# Patient Record
Sex: MALE | Race: BLACK OR AFRICAN AMERICAN | Employment: OTHER | URBAN - METROPOLITAN AREA
[De-identification: names, ages, dates, MRNs, and addresses within clinical notes are randomized per-mention and may not be internally consistent; named-entity substitution may affect disease eponyms.]

---

## 2020-09-11 ENCOUNTER — HOSPITAL ENCOUNTER (OUTPATIENT)
Dept: SURGERY | Age: 45
Discharge: HOME | End: 2020-09-11

## 2020-09-13 ENCOUNTER — ANESTHESIA EVENT (OUTPATIENT)
Dept: SURGERY | Age: 45
End: 2020-09-13

## 2020-09-14 ENCOUNTER — HOSPITAL ENCOUNTER (OUTPATIENT)
Age: 45
Setting detail: OUTPATIENT SURGERY
Discharge: HOME | End: 2020-09-14
Attending: ORTHOPAEDIC SURGERY | Admitting: ORTHOPAEDIC SURGERY

## 2020-09-14 ENCOUNTER — APPOINTMENT (OUTPATIENT)
Dept: GENERAL RADIOLOGY | Age: 45
End: 2020-09-14
Attending: ORTHOPAEDIC SURGERY

## 2020-09-14 ENCOUNTER — ANESTHESIA (OUTPATIENT)
Dept: SURGERY | Age: 45
End: 2020-09-14

## 2020-09-14 VITALS
HEART RATE: 60 BPM | DIASTOLIC BLOOD PRESSURE: 67 MMHG | SYSTOLIC BLOOD PRESSURE: 135 MMHG | BODY MASS INDEX: 25.42 KG/M2 | RESPIRATION RATE: 16 BRPM | TEMPERATURE: 98 F | OXYGEN SATURATION: 98 % | WEIGHT: 198 LBS

## 2020-09-14 PROCEDURE — 74011250636 HC RX REV CODE- 250/636: Performed by: ORTHOPAEDIC SURGERY

## 2020-09-14 PROCEDURE — 74011250636 HC RX REV CODE- 250/636: Performed by: NURSE ANESTHETIST, CERTIFIED REGISTERED

## 2020-09-14 PROCEDURE — 76210000006 HC OR PH I REC 0.5 TO 1 HR: Performed by: ORTHOPAEDIC SURGERY

## 2020-09-14 PROCEDURE — C1713 ANCHOR/SCREW BN/BN,TIS/BN: HCPCS | Performed by: ORTHOPAEDIC SURGERY

## 2020-09-14 PROCEDURE — 77030003044 HC WRE K WRGH -B: Performed by: ORTHOPAEDIC SURGERY

## 2020-09-14 PROCEDURE — 74011000250 HC RX REV CODE- 250: Performed by: NURSE ANESTHETIST, CERTIFIED REGISTERED

## 2020-09-14 PROCEDURE — 77030012771 HC BIT DRL WRGH -B: Performed by: ORTHOPAEDIC SURGERY

## 2020-09-14 PROCEDURE — 77030000032 HC CUF TRNQT ZIMM -B: Performed by: ORTHOPAEDIC SURGERY

## 2020-09-14 PROCEDURE — 77030010509 HC AIRWY LMA MSK TELE -A: Performed by: ANESTHESIOLOGY

## 2020-09-14 PROCEDURE — 74011250637 HC RX REV CODE- 250/637: Performed by: ANESTHESIOLOGY

## 2020-09-14 PROCEDURE — 74011250636 HC RX REV CODE- 250/636: Performed by: ANESTHESIOLOGY

## 2020-09-14 PROCEDURE — 77030040922 HC BLNKT HYPOTHRM STRY -A: Performed by: ANESTHESIOLOGY

## 2020-09-14 PROCEDURE — 77030019908 HC STETH ESOPH SIMS -A: Performed by: ANESTHESIOLOGY

## 2020-09-14 PROCEDURE — 76210000020 HC REC RM PH II FIRST 0.5 HR: Performed by: ORTHOPAEDIC SURGERY

## 2020-09-14 PROCEDURE — 76942 ECHO GUIDE FOR BIOPSY: CPT | Performed by: ORTHOPAEDIC SURGERY

## 2020-09-14 PROCEDURE — 2709999900 HC NON-CHARGEABLE SUPPLY: Performed by: ORTHOPAEDIC SURGERY

## 2020-09-14 PROCEDURE — 76060000036 HC ANESTHESIA 2.5 TO 3 HR: Performed by: ORTHOPAEDIC SURGERY

## 2020-09-14 PROCEDURE — 76010000172 HC OR TIME 2.5 TO 3 HR INTENSV-TIER 1: Performed by: ORTHOPAEDIC SURGERY

## 2020-09-14 PROCEDURE — 77030002933 HC SUT MCRYL J&J -A: Performed by: ORTHOPAEDIC SURGERY

## 2020-09-14 PROCEDURE — 77030029732 HC BIT DRL ORTHOLOC 3DI WRGH -B: Performed by: ORTHOPAEDIC SURGERY

## 2020-09-14 PROCEDURE — 77030021122 HC SPLNT MAT FST BSNM -A: Performed by: ORTHOPAEDIC SURGERY

## 2020-09-14 PROCEDURE — 77030003602 HC NDL NRV BLK BBMI -B: Performed by: NURSE ANESTHETIST, CERTIFIED REGISTERED

## 2020-09-14 PROCEDURE — 76010010054 HC POST OP PAIN BLOCK: Performed by: ORTHOPAEDIC SURGERY

## 2020-09-14 DEVICE — CORTICAL SCREW
Type: IMPLANTABLE DEVICE | Site: ANKLE | Status: FUNCTIONAL
Brand: ORTHOLOC

## 2020-09-14 DEVICE — IMPLANTABLE DEVICE
Type: IMPLANTABLE DEVICE | Site: ANKLE | Status: FUNCTIONAL
Brand: ORTHOLOC 3DI

## 2020-09-14 DEVICE — HEADED COMPRESSION SCREW
Type: IMPLANTABLE DEVICE | Site: ANKLE | Status: FUNCTIONAL
Brand: DART-FIRE

## 2020-09-14 DEVICE — IMPLANTABLE DEVICE
Type: IMPLANTABLE DEVICE | Site: ANKLE | Status: FUNCTIONAL
Brand: ORTHOLOC

## 2020-09-14 DEVICE — K-WIRE BLUNT/TROCAR: Type: IMPLANTABLE DEVICE | Site: ANKLE | Status: FUNCTIONAL

## 2020-09-14 DEVICE — IMPLANTABLE DEVICE
Type: IMPLANTABLE DEVICE | Site: ANKLE | Status: FUNCTIONAL
Brand: GRAVITY SYNCHFIX

## 2020-09-14 DEVICE — IMPLANTABLE DEVICE
Type: IMPLANTABLE DEVICE | Site: ANKLE | Status: FUNCTIONAL
Brand: ORTHOLOC 3DI PLATING SYSTEM

## 2020-09-14 RX ORDER — DEXAMETHASONE SODIUM PHOSPHATE 4 MG/ML
INJECTION, SOLUTION INTRA-ARTICULAR; INTRALESIONAL; INTRAMUSCULAR; INTRAVENOUS; SOFT TISSUE AS NEEDED
Status: DISCONTINUED | OUTPATIENT
Start: 2020-09-14 | End: 2020-09-14 | Stop reason: HOSPADM

## 2020-09-14 RX ORDER — SODIUM CHLORIDE 0.9 % (FLUSH) 0.9 %
5-40 SYRINGE (ML) INJECTION AS NEEDED
Status: DISCONTINUED | OUTPATIENT
Start: 2020-09-14 | End: 2020-09-14 | Stop reason: HOSPADM

## 2020-09-14 RX ORDER — MIDAZOLAM HYDROCHLORIDE 1 MG/ML
2 INJECTION, SOLUTION INTRAMUSCULAR; INTRAVENOUS
Status: COMPLETED | OUTPATIENT
Start: 2020-09-14 | End: 2020-09-14

## 2020-09-14 RX ORDER — LIDOCAINE HYDROCHLORIDE 20 MG/ML
INJECTION, SOLUTION EPIDURAL; INFILTRATION; INTRACAUDAL; PERINEURAL AS NEEDED
Status: DISCONTINUED | OUTPATIENT
Start: 2020-09-14 | End: 2020-09-14 | Stop reason: HOSPADM

## 2020-09-14 RX ORDER — HYDROMORPHONE HYDROCHLORIDE 2 MG/ML
0.5 INJECTION, SOLUTION INTRAMUSCULAR; INTRAVENOUS; SUBCUTANEOUS
Status: DISCONTINUED | OUTPATIENT
Start: 2020-09-14 | End: 2020-09-14 | Stop reason: HOSPADM

## 2020-09-14 RX ORDER — PROPOFOL 10 MG/ML
INJECTION, EMULSION INTRAVENOUS AS NEEDED
Status: DISCONTINUED | OUTPATIENT
Start: 2020-09-14 | End: 2020-09-14 | Stop reason: HOSPADM

## 2020-09-14 RX ORDER — SODIUM CHLORIDE, SODIUM LACTATE, POTASSIUM CHLORIDE, CALCIUM CHLORIDE 600; 310; 30; 20 MG/100ML; MG/100ML; MG/100ML; MG/100ML
75 INJECTION, SOLUTION INTRAVENOUS CONTINUOUS
Status: DISCONTINUED | OUTPATIENT
Start: 2020-09-14 | End: 2020-09-14 | Stop reason: HOSPADM

## 2020-09-14 RX ORDER — CEFAZOLIN SODIUM/WATER 2 G/20 ML
2 SYRINGE (ML) INTRAVENOUS ONCE
Status: COMPLETED | OUTPATIENT
Start: 2020-09-14 | End: 2020-09-14

## 2020-09-14 RX ORDER — FENTANYL CITRATE 50 UG/ML
100 INJECTION, SOLUTION INTRAMUSCULAR; INTRAVENOUS ONCE
Status: COMPLETED | OUTPATIENT
Start: 2020-09-14 | End: 2020-09-14

## 2020-09-14 RX ORDER — SODIUM CHLORIDE 0.9 % (FLUSH) 0.9 %
5-40 SYRINGE (ML) INJECTION EVERY 8 HOURS
Status: DISCONTINUED | OUTPATIENT
Start: 2020-09-14 | End: 2020-09-14 | Stop reason: HOSPADM

## 2020-09-14 RX ORDER — OXYCODONE AND ACETAMINOPHEN 5; 325 MG/1; MG/1
1 TABLET ORAL AS NEEDED
Status: DISCONTINUED | OUTPATIENT
Start: 2020-09-14 | End: 2020-09-14 | Stop reason: HOSPADM

## 2020-09-14 RX ORDER — NALOXONE HYDROCHLORIDE 0.4 MG/ML
0.2 INJECTION, SOLUTION INTRAMUSCULAR; INTRAVENOUS; SUBCUTANEOUS AS NEEDED
Status: DISCONTINUED | OUTPATIENT
Start: 2020-09-14 | End: 2020-09-14 | Stop reason: HOSPADM

## 2020-09-14 RX ORDER — LIDOCAINE HYDROCHLORIDE 10 MG/ML
0.1 INJECTION INFILTRATION; PERINEURAL AS NEEDED
Status: DISCONTINUED | OUTPATIENT
Start: 2020-09-14 | End: 2020-09-14 | Stop reason: HOSPADM

## 2020-09-14 RX ORDER — ONDANSETRON 2 MG/ML
INJECTION INTRAMUSCULAR; INTRAVENOUS AS NEEDED
Status: DISCONTINUED | OUTPATIENT
Start: 2020-09-14 | End: 2020-09-14 | Stop reason: HOSPADM

## 2020-09-14 RX ADMIN — ONDANSETRON 4 MG: 2 INJECTION INTRAMUSCULAR; INTRAVENOUS at 16:11

## 2020-09-14 RX ADMIN — ROPIVACAINE HYDROCHLORIDE 30 ML: 5 INJECTION, SOLUTION EPIDURAL; INFILTRATION; PERINEURAL at 12:39

## 2020-09-14 RX ADMIN — SODIUM CHLORIDE, SODIUM LACTATE, POTASSIUM CHLORIDE, AND CALCIUM CHLORIDE: 600; 310; 30; 20 INJECTION, SOLUTION INTRAVENOUS at 13:59

## 2020-09-14 RX ADMIN — MIDAZOLAM 2 MG: 1 INJECTION INTRAMUSCULAR; INTRAVENOUS at 12:34

## 2020-09-14 RX ADMIN — LIDOCAINE HYDROCHLORIDE 60 MG: 20 INJECTION, SOLUTION EPIDURAL; INFILTRATION; INTRACAUDAL; PERINEURAL at 14:05

## 2020-09-14 RX ADMIN — DEXAMETHASONE SODIUM PHOSPHATE 4 MG: 4 INJECTION, SOLUTION INTRAMUSCULAR; INTRAVENOUS at 14:18

## 2020-09-14 RX ADMIN — PROPOFOL 200 MG: 10 INJECTION, EMULSION INTRAVENOUS at 14:06

## 2020-09-14 RX ADMIN — Medication 2 G: at 14:12

## 2020-09-14 RX ADMIN — ROPIVACAINE HYDROCHLORIDE 15 ML: 5 INJECTION, SOLUTION EPIDURAL; INFILTRATION; PERINEURAL at 12:41

## 2020-09-14 RX ADMIN — OXYCODONE HYDROCHLORIDE AND ACETAMINOPHEN 1 TABLET: 5; 325 TABLET ORAL at 17:02

## 2020-09-14 RX ADMIN — FENTANYL CITRATE 50 MCG: 50 INJECTION, SOLUTION INTRAMUSCULAR; INTRAVENOUS at 12:34

## 2020-09-14 RX ADMIN — PROPOFOL 200 MG: 10 INJECTION, EMULSION INTRAVENOUS at 14:05

## 2020-09-14 RX ADMIN — SODIUM CHLORIDE, SODIUM LACTATE, POTASSIUM CHLORIDE, AND CALCIUM CHLORIDE 75 ML/HR: 600; 310; 30; 20 INJECTION, SOLUTION INTRAVENOUS at 12:44

## 2020-09-14 NOTE — ANESTHESIA PROCEDURE NOTES
Peripheral Block    Start time: 9/14/2020 12:35 PM  End time: 9/14/2020 12:39 PM  Performed by: Ciro Brock MD  Authorized by: Ciro Brock MD       Pre-procedure:    Indications: at surgeon's request and post-op pain management    Preanesthetic Checklist: patient identified, risks and benefits discussed, site marked, timeout performed, anesthesia consent given and patient being monitored    Timeout Time: 12:34          Block Type:   Block Type:  Popliteal  Laterality:  Right  Monitoring:  Standard ASA monitoring, continuous pulse ox, frequent vital sign checks, heart rate, oxygen and responsive to questions  Injection Technique:  Single shot  Procedures: ultrasound guided and nerve stimulator    Patient Position: supine  Prep: chlorhexidine    Location:  Lower thigh  Needle Type:  Stimuplex  Needle Gauge:  22 G  Needle Localization:  Nerve stimulator and ultrasound guidance  Motor Response: minimal motor response >0.4 mA      Assessment:  Number of attempts:  1  Injection Assessment:  Incremental injection every 5 mL, no paresthesia, ultrasound image on chart, local visualized surrounding nerve on ultrasound, negative aspiration for blood and no intravascular symptoms  Patient tolerance:  Patient tolerated the procedure well with no immediate complications  Local anesthetic visualized surrounding both posterior tibial nerve and common peroneal nerve at the point of bifurcation

## 2020-09-14 NOTE — ANESTHESIA POSTPROCEDURE EVALUATION
Procedure(s):  RIGHT ANKLE TRIMALLEOLAR OPEN REDUCTION INTERNAL FIXATION AND SYNDEMOSIS ORIF/ BLOCK AND MAC  ANKLE ARTHROSCOPY DEBRIDEMENT/ RIGHT. general    Anesthesia Post Evaluation      Multimodal analgesia: multimodal analgesia used between 6 hours prior to anesthesia start to PACU discharge  Patient location during evaluation: PACU  Patient participation: complete - patient participated  Level of consciousness: awake  Pain management: adequate  Airway patency: patent  Anesthetic complications: no  Cardiovascular status: acceptable and hemodynamically stable  Respiratory status: acceptable  Hydration status: acceptable  Comments: Acceptable for discharge from PACU. Post anesthesia nausea and vomiting:  none  Final Post Anesthesia Temperature Assessment:  Normothermia (36.0-37.5 degrees C)      INITIAL Post-op Vital signs:   Vitals Value Taken Time   /74 9/14/2020  4:59 PM   Temp     Pulse 65 9/14/2020  5:03 PM   Resp 16 9/14/2020  4:55 PM   SpO2 98 % 9/14/2020  5:03 PM   Vitals shown include unvalidated device data.

## 2020-09-14 NOTE — ANESTHESIA PROCEDURE NOTES
Peripheral Block    Start time: 9/14/2020 12:40 PM  End time: 9/14/2020 12:41 PM  Performed by: Jesse Mirza MD  Authorized by: Jesse Mirza MD       Pre-procedure: Indications: at surgeon's request and post-op pain management    Preanesthetic Checklist: patient identified, risks and benefits discussed, site marked, timeout performed, anesthesia consent given and patient being monitored    Timeout Time: 12:40          Block Type:   Block Type:   Adductor canal  Laterality:  Right  Monitoring:  Standard ASA monitoring, continuous pulse ox, frequent vital sign checks, heart rate, responsive to questions and oxygen  Injection Technique:  Single shot  Procedures: ultrasound guided    Patient Position: supine  Prep: chlorhexidine    Location:  Mid thigh  Needle Type:  Stimuplex  Needle Gauge:  21 G  Needle Localization:  Ultrasound guidance    Assessment:  Number of attempts:  1  Injection Assessment:  Incremental injection every 5 mL, no paresthesia, ultrasound image on chart, local visualized surrounding nerve on ultrasound, negative aspiration for blood and no intravascular symptoms  Patient tolerance:  Patient tolerated the procedure well with no immediate complications

## 2020-09-14 NOTE — H&P
Update to H&P    To perform R ankle arthroscopy, R ankle ORIF trimal without fixtion of posterior lip, R syndesmosis fixation    RESP: CTAB  CV: RRR

## 2020-09-14 NOTE — DISCHARGE INSTRUCTIONS
INSTRUCTIONS FOLLOWING FOOT SURGERY    ACTIVITY  Elevate foot (feet) for 48 hours. Use crutches as directed by your doctor. No weight bearing on operative foot. Aspirin 325 mg po everyday  Pt. Has prescription fpr pain. DIET  Clear liquids until no nausea or vomiting; then light diet for the first day. Advance to regular diet on second day, unless your doctor orders otherwise. PAIN  Take pain medications as directed by your doctor. Call your doctor if pain is NOT relieved by medication. DO NOT take aspirin or blood thinners until directed by your doctor. DRESSING CARE      FOLLOW-UP PHONE CALLS  Calls will be made by nursing staff. If you have any problems, call your doctor as needed. CALL YOUR DOCTOR IF YOU HAVE  Excessive bleeding that does not stop after holding mild pressure over the area. Temperature of 101 degrees or above. Redness, excessive swelling or bruising, and/or green or yellow, smelly discharge from incision. Loss of sensation - cold, white or blue toes. AFTER ANESTHESIA  For the first 24 hours and while taking narcotics for pain: DO NOT Drive, Drink Alcoholic beverages, or make important Decisions. Be aware of dizziness following anesthesia and while taking pain medication. OTHER INSTRUCTIONS    APPOINTMENT DATE/TIME_________________________________    Leni Champion DOCTOR'S PHONE NUMBER__________________________ACTIVITY  · As tolerated and as directed by your doctor. · Bathe or shower as directed by your doctor. DIET  · Clear liquids until no nausea or vomiting; then light diet for the first day. · Advance to regular diet on second day, unless your doctor orders otherwise. · If nausea and vomiting continues, call your doctor. PAIN  · Take pain medication as directed by your doctor. · Call your doctor if pain is NOT relieved by medication. · DO NOT take aspirin of blood thinners unless directed by your doctor.      DRESSING CARE       CALL YOUR DOCTOR IF · Excessive bleeding that does not stop after holding pressure over the area  · Temperature of 101 degrees F or above  · Excessive redness, swelling or bruising, and/ or green or yellow, smelly discharge from incision    AFTER ANESTHESIA   · For the first 24 hours: DO NOT Drive, Drink alcoholic beverages, or Make important decisions. · Be aware of dizziness following anesthesia and while taking pain medication. APPOINTMENT DATE/ TIME    YOUR DOCTOR'S PHONE NUMBER       DISCHARGE SUMMARY from Nurse    PATIENT INSTRUCTIONS:    After general anesthesia or intravenous sedation, for 24 hours or while taking prescription Narcotics:  · Limit your activities  · Do not drive and operate hazardous machinery  · Do not make important personal or business decisions  · Do  not drink alcoholic beverages  · If you have not urinated within 8 hours after discharge, please contact your surgeon on call. *  Please give a list of your current medications to your Primary Care Provider. *  Please update this list whenever your medications are discontinued, doses are      changed, or new medications (including over-the-counter products) are added. *  Please carry medication information at all times in case of emergency situations. These are general instructions for a healthy lifestyle:    No smoking/ No tobacco products/ Avoid exposure to second hand smoke    Surgeon General's Warning:  Quitting smoking now greatly reduces serious risk to your health. Obesity, smoking, and sedentary lifestyle greatly increases your risk for illness    A healthy diet, regular physical exercise & weight monitoring are important for maintaining a healthy lifestyle    You may be retaining fluid if you have a history of heart failure or if you experience any of the following symptoms:  Weight gain of 3 pounds or more overnight or 5 pounds in a week, increased swelling in our hands or feet or shortness of breath while lying flat in bed. Please call your doctor as soon as you notice any of these symptoms; do not wait until your next office visit. Recognize signs and symptoms of STROKE:    F-face looks uneven    A-arms unable to move or move unevenly    S-speech slurred or non-existent    T-time-call 911 as soon as signs and symptoms begin-DO NOT go       Back to bed or wait to see if you get better-TIME IS BRAIN. Patient Education        Learning About COVID-19 and Social Distancing  What is it? Social distancing means putting space between yourself and other people. The recommended distance is 6 feet, or about 2 meters. This also means staying away from any place where people may gather, such as [place] or other public gathering places. Why is it important? Social distancing is the best way to reduce the spread of COVID-19. This virus seems to spread from person to person through droplets from coughing and sneezing. So if you keep your distance from others, you're less likely to get it or spread it. And social distancing is important for everyone, not just those who are at high risk of infection, like older people. You might have the virus but not have symptoms. You could then give the infection to someone you come into contact with. How is it done? Putting 6 feet, or about 2 meters, between you and other people is the recommended distance. Also stay away from any place where people may gather, such as [place] or other public gathering places. So if possible:  · Work from home, and keep your kids at home. · Don't travel if you don't have to. And avoid public transportation, ride-shares, and taxis unless you have no choice. · Limit shopping to essentials, like food and medicines. · Wear a cloth face cover if you have to go to a public place like the grocery store or pharmacy. · Don't eat in restaurants. (You can still get takeout or food deliveries.)  · Avoid crowds and busy places.  Follow stay-at-home orders or other directions for your area.  Current as of: July 10, 2020               Content Version: 12.6  © 6088-2307 [employer]. Care instructions adapted under license by [employer] (which disclaims liability or warranty for this information). If you have questions about a medical condition or this instruction, always ask your healthcare professional. [address] any warranty or liability for your use of this information.

## 2020-09-14 NOTE — H&P
Summary: Right Ankle trimalleolar fracture. CT scan ordered to evaluate posterior Mall. Plan for surgery next week    CC: Right ankle pain    HPI:  Patient presents with right ankle pain after a  twisting event. This occurred this past weekend while in [address] [place] [address]. He was at a skating rink with his daughter when semi-tripped him, and he hurt his ankle. .  They describe the pain as 10 10. It is a sharp jabbing pain with movement and a dull throbbing pain at rest.   Attempting to bear weight, moving the ankle, and direct pressure makes this pain worse. Not bearing weight, elevation and being still helps some. Initially went to the ER there who attended multiple reductions and finally got it reduced. He now presents to us here in [place]    ROS:  Standardized patient intake form was reviewed and signed by me. It covers 10 organ systems. [address]:  Past Medical, Family and Social history was obtained by standardized patient intake form. It was reviewed and signed by me  Allergies:????? Medications:Aspirin (325 MG, Take 1 tab once a day after surgery); Colace (100 MG, Take 1 tablet once a day); Percocet (5-325 MG, take 1 hudson 4-6 hours as needed for prn pain); Zofran (4 MG, take 1 tablet by mouth every 6 hours as needed n/v)    Tobacco: Smoker   Diabetes: None   Recent A1C: /    PE:  Well developed and well nourished  patient in No acute distress, aaox3, appropriate mood and affect. Patient requires assistive devices to ambulate Pulse: 80  BUE: show   normal full motion of bilateral wrist, no thenar atrophy, normal  strength, hands are non TTP, with no skin lesions. No pathologic reflexes noted: negative Weinberg, negative inverted radial reflex. Negative Wartenberg sign. BLE: show normal monofilament sensation, no palpable lymphadenopathy, patellar tendon reflexes 3+. Left LE -- this is the unaffected side: FROM actively of toes, foot, ankle, knee and hip.  No instability of foot or ankle with drawer and stress. 5/5 strength to TA/EHL/GSC/Peroneals/PTib. No skin lesions, Non TTP throughout. There is no edema or ecchymosis. Right LE -- this is the affected side:  2+ DP. Toes wwp x5 w BCR. EHLFHLEDLFDL intact but no strength due to ankle pain. Unable to test movement and strength of foot and ankle due to pain. There is ecchymosis lateral and medially about the ankle. TTP at the medial and lateral mal.  They  are not TTP at the proximal fibula.  + SILT to ssspdpt nerves. There  are not any wounds. There  are not fracture blisters. I left the splint on intact but I did pull his foot out of the splint to inspect the skin. XR: 3 view ankle reviewed by me and shows trimalleolar ankle fracture. There is displacement of the medial malleolus segment. I see no other fractures. The posterior malleolus is not fully examined underneath this splint. AssessmentDiagnosis: Right Ankle Fracture:  trimal    Plan:  1- Weight Bearing  NWB  2-  Immobilization -  Short leg splint  3- Medication  pre op medication given today [ ]. I personally had a discussion about abuse potential of narcotics, how narcotics are only given for acute injuries or operative pain, and how no refills can nor will be given until the patients next appointment. They expressed understanding. 4- [ ]We had along discussion and decided to pursue operative treatment. I discussed the risk of surgery being infection, malunion, nonunion, wound healing issues, arthritis, painful hardware, blood clots, vascular injury, nerve injury. He understands all these risks. I explained how this ankle fracture will not heal itself and now is very unstable. He is very insightful to this. We will also need to get a CT scan to fully evaluate the posterior malleolus fragment.   This would give him the indications whether or not a posterior approach needs to be pursued or not          Electronically Signed By Cathryn Joel MD on 2020-09-10

## 2020-09-14 NOTE — ANESTHESIA PREPROCEDURE EVALUATION
Relevant Problems   No relevant active problems       Anesthetic History   No history of anesthetic complications            Review of Systems / Medical History  Patient summary reviewed and pertinent labs reviewed    Pulmonary          Smoker         Neuro/Psych   Within defined limits           Cardiovascular                  Exercise tolerance: >4 METS     GI/Hepatic/Renal  Within defined limits              Endo/Other  Within defined limits           Other Findings              Physical Exam    Airway  Mallampati: II  TM Distance: 4 - 6 cm  Neck ROM: normal range of motion   Mouth opening: Normal     Cardiovascular    Rhythm: regular           Dental    Dentition: Upper partial plate     Pulmonary  Breath sounds clear to auscultation               Abdominal  GI exam deferred       Other Findings            Anesthetic Plan    ASA: 2  Anesthesia type: general      Post-op pain plan if not by surgeon: peripheral nerve block single    Induction: Intravenous

## 2020-09-15 NOTE — OP NOTES
Operative Note    Patient:Dani Rehman Hof  MRN: [id number]    Date Of Surgery: 9/14/2020    Surgeon: Harrison Menon MD    Assistant Surgeon: None    Procedure Performed:   1- ORIF R Trimalleolar ankle fracture without fixation of Posterior Lip - CPT 73615  2- ORIF Right syndesmosis - CPT 25285  3- Arthroscopic Debridement of Right Ankle Limited - CPT 55155    Pre Op Diagnosis:  1- Right trimalleolar ankle fracture  2- Right syndesmosis sprain    Post Op Diagnosis:   same    Implants:   Implant Name Type Inv. Item Serial No.  Lot No. LRB No. Used Action   KIT INT FIX IMPL INSTR SYNDESMOTIC FIX DEV W/ BLNT [facility] - [id number]  KIT INT FIX IMPL INSTR SYNDESMOTIC FIX DEV W/ BLNT [facility] [employer]_ [id number] Right 1 Implanted   PLATE BNE T505FS R LAT FIBULAR ORTHOLOC 3DI - [id number]  PLATE BNE F095PG R LAT FIBULAR ORTHOLOC 3DI  [employer]_ [id number] Right 1 Implanted   SCREW BNE L20MM DIA3. 5MM BRNZ LINDA TI ANK FULL THRD LAG - [id number]  SCREW BNE L20MM DIA3. 5MM BRNZ LINDA TI ANK FULL THRD LAG  [employer]_ [id number] Right 1 Implanted   K WIRE FIX L150MM DIA1. 4MM BLNT TRCR TIP FOR [employer] - [id number]  K WIRE FIX L150MM DIA1. 4MM BLNT TRCR TIP FOR [employer]  [employer]_ [id number] Right 2 Implanted   SCREW BNE L12MM DIA3.5MM LINDA TI POLYAX NONLOCKING FULL - [id number]  SCREW BNE L12MM DIA3.5MM LINDA TI POLYAX NONLOCKING FULL  [employer]_ [id number] Right 1 Implanted   SCREW BNE L14MM DIA3.5MM LINDA FT ANK TI NONLOCKING FULL - [id number]  SCREW BNE L14MM DIA3.5MM LINDA FT ANK TI NONLOCKING FULL  [employer]_ [id number] Right 1 Implanted   SCREW BNE L16MM DIA3.5MM LINDA FT ANK TI NONLOCKING FULL - [id number]  SCREW BNE L16MM DIA3.5MM LINDA FT ANK TI NONLOCKING FULL  [employer]_WD [id number] Right 1 Implanted   SCREW BNE L14MM DIA3.5MM LINDA FT ANK TI HUGO FULL THRD FOR - [id number]  SCREW BNE L14MM DIA3.5MM LINDA FT ANK TI HUGO FULL THRD FOR  [employer]WD [id number] Right 2 Implanted   SCREW BNE L16MM DIA3.5MM LINDA FT ANK TI HUGO FULL THRD FOR - [id number]  SCREW BNE L16MM DIA3.5MM LINDA FT ANK TI HUGO FULL THRD FOR  [employer]WD [id number] Right 1 Implanted   KIT INT FIX IMPL INSTR SYNDESMOTIC FIX DEV W/ BLNT [employer] - [id number]  KIT INT FIX IMPL INSTR SYNDESMOTIC FIX DEV W/ BLNT [facility] [employer]_WD [id number] Right 1 Implanted   SCREW BNE L46MM DIA4MM SM DK CHRIS TI ST SELF DRL DMITRY - [id number]  SCREW BNE L46MM DIA4MM SM DK CHRIS TI ST SELF DRL DMITRY  [employer]_WD [id number] Right 2 Implanted       Anesthesia:  MAC    Blood Loss:  10cc    Tourniquet:  Estimated 353 minutes    Complications:  none    Pre Operative Abx:   Ancef 2g    Specimens/Cultures:  none    Significant Findings:  Very unstable syndesmosis. Complete disruption of the syndesmosis AIFTL and IOM. Arthroscopic views showed very big drive through sign along with soft tissue caught in the medial gutter. No chondral injury to the weight bearing surfaces but there was a small anterior aspect of tibia plafond that was fractured but this was minimal.  The anterior capsule had significant injury with traumatic rupture. Pre Operative Course:  Leonie Jimenez is a 39 y.o. male who 1 wk ago sustained a Right ankle fracture dislocation in [facility] required closed reduction. After it was reduced it was sent to me for fixation. CT scan showed comminuted posterior medial posterior mal fracture, but it was a small section of the articular surface. Operation In Detail:  Patient was evaluated in the preoperative area. The right lower extremity was marked by me. We had a long discussion about the procedure and postoperative protocols. The patient was then brought back to the operating room suite and placed in the operating room table.   A timeout was taken to identify the patient, procedure being performed, and laterality. After this the patient was prepped and draped in the normal sterile fashion using a Betadine solution and/or a ChloraPrep solution. A timeout was then taken to identify the patient his name, date of birth, laterality, and procedure being performed. We also identified allergies and any concerns about the operation. Attention was then placed to the operative extremity. The leg was exsanguinated and the thigh tourniquet inflated to 250mmHg. Next a small stab incision over the Anterior medial ankle, just medial to the Ant Tib tendon was made. A hemostat was then used to penetrate the joint capsule. Hematoma was immediately seen, and the arthroscopic camera inserted. Due to the large amount of synovitis and hematoma not much could be visualized. Using the transillumination technique over the Anterior Lateral ankle, and using the nick-and-spread technique we made a small incision over the anterior lateral ankle. We then used a hemostat to penetrate the capsule and inserted a shaver. We then inspected the joint. The shaver was easily inserted into the syndesmosis indicated injury. We debrided a lot of synovitis both medially and lateally. The was obviously soft tissue trapped in the medial gutter. It was debrided out too. Manual distraction was used, but even with great visualization I saw no chondral lesions to the talus or tibia or fibula. I did notice how the anterior capsule of the ankle was partially torn. We then removed all scope materials. The medial malleolus was then addressed. Using a longitudinal incision with a 15 blade knife, a medial incision over the medial mal was made. The saphenous vein was identified and protected. Small skin bleeders were cauterized. We used a knife to expose the fracture, and continued to use the knife as well as curettes to debrided the fracture site.   We removed entrapped deltoid ligament, soft tissue, and bone fragments. The talar dome was examined and no talar dome lesions were noted. Using a point to point clamp the fracture was reduced. Direct visualization and Xray were used to confirm a good reduction. It was not well reduced so we took off the clamp and inspected the inner medial mal.  There was soft tissue blocking some reduction and affecting the talus shift. I resected this soft tissue, and again reduced and clamped the fracture. After reduction was confirmed 2 parallel k wires were placed; starting at the tip of the malleolus and extending into the tibia. The 2 parallel k wires were then drilled over using a cannulated drill. 2 46 mm partially threaded cannulated screw were placed. The fracture was examined and a good reduction was kept. A lateral incision over the fibula was made with a 15 blade knife. The skin was incised, the superficial perineal nerve protected, and this dissection was taken down to the fracture site. The incision was carried distally down to the tip of the fibula. The peroneal tendons behind the fibula were protected. Then using a knife, rongeur, and curette we debrided the fracture site of any soft tissues and bone fragments. After adequate debridement of the fracture, we irrigated the fracture. Using reduction clamps and K wires the fracture was reduced. X Rays and direct visualization was used to insure fibular length, alignment and rotation. This fixation began with the fracture still clamped. Using lag screw technique  1 2.7 lag screw was placed across the fracture site. The clamps were removed, and a distal fibula locking plate was applied to the fibula and held there with a clamp. Xray A-P and lateral were used to confirm good positioning. A drill was used to drill the fibula, the holes measure and an appropriate length screw placed.   The plate was then secured to the bone with appropriate length screws in the same manner. The ankle was then given an external rotation and a Cotton syndesmosis test on XR. The syndesmosis was unstable    The syndesmosis was noted to be unstable. Even direct visualization showed significant translation in the A-P and M-L directions. The syndesmosis was reduced using thumb reduction with visualization. It was held there with a thumb and a K wire. Using a Silicon Mitus suture button fixation device, we drilled through the lateral fibula plate, through the fibula, across the IOM, and bicortically in the tibia. The suture button was then advanced from lateral to medial.  The button was attached medially through the medial mal incision wound. Once the button was through the tibia far cortex, it was confirmed to be flush against the bone with no soft tissue under it. This was done with direct visualization. The syndesmosis was reduced using a thumb reduction and direct visualization, followed by the securing and tightening of the suture button. This process was drilling, placing a button, and tightening the button was repeated with a 2nd suture button device. Ankle stress test then confirmed reduction, along with Xray. Direct visualization showed a well reduced syndesmosis too. The ankle scope was then reinserted and the joint examined. This time the syndesmosis had no widening and I was unable to insert the shaver in it. Concerning the medial gutter, we examined it closely looking for any soft tissue. There was none seen in the gutter and it the chondral surface of the talus was against the chondral surface of the medial mal, with nothing interposed. I debrided some more synovitis anteriorly. I then manually distracted the ankle and examined the posterior ankle. The Post Mal was well reduced and there was no articular step off and no signs of loose chondral fragments. All scope materials were removed.       All the wounds were copiously irrigated, and the deep layers closed with moncryl and superficial layers with a nylon suture. A sterile dressing was then applied to the leg and Posterior slab splint with Stirrups. They were awoken from anesthesia and returned to the PACU without difficulty.     Post Operative Plan:   1- WB status: Non-Weight Bearing   2- Follow Up: 2-3 weeks  3- Immobilization/assistive devices: brace/splint  4- DVT px: ASA 325mg PO Daily   5- Pain Medication: pre op meds already given  6 - significant injury that may require 6-8 wks of non weight bearing and then PT

## 2024-04-15 ENCOUNTER — NURSE TRIAGE (OUTPATIENT)
Dept: OTHER | Facility: CLINIC | Age: 49
End: 2024-04-15

## 2024-04-15 NOTE — TELEPHONE ENCOUNTER
Location of [place]    Received call from Karey at [facility]; Patient with Red Flag Complaint requesting to establish care with [facility].    Subjective: Caller states \"experiencing chest tightness and throat tightness.\"     Current Symptoms:   Started a while ago. Went to [place] ED last time it happened. Was prescribed medication, but it is out now.   Believes it was  Amlodipine. Checks BP at home and has been 120's over 80s, nothing over 140 SBP.  The blood pressure medication has been out for a few weeks. Tightness in upper chest, and feeling of tightness in throat.   Chest tightness happens daily but today he also had throat tightness that hasn't gone away, chest tightness happens when he eats certain things as well, but has been eating mostly fruits and veggies to avoid it. Denies it being pain, just discomfort.   States his breathing is fine, and his o2 is 97%.   Thinks it could be attributed it anxiety.   Denies radiation.   Seems to be right in the middle.   Feels tight when he has his seat belt on.   Denies heart beat abnormalities.   HTN noted in March when he went to the [facility] ED.         Pain Severity: 7/10; tightness.; intermittent happens everydy        Recommended disposition: Call  Now    Care advice provided, patient verbalizes understanding; denies any other questions or concerns; instructed to call back for any new or worsening symptoms.    Patient/caller agrees to calling [phone]    Attention Provider:  Thank you for allowing me to participate in the care of your patient.  The patient was connected to triage in response to information provided to the [facility].  Please do not respond through this encounter as the response is not directed to a shared pool.            Reason for Disposition   [1] Chest pain lasts > 5 minutes AND [2] age > 30 AND [3] one or more cardiac risk factors (e.g., diabetes, high blood pressure, high cholesterol, smoker, or strong family history of

## (undated) DEVICE — BUTTON SWITCH PENCIL BLADE ELECTRODE, HOLSTER: Brand: EDGE

## (undated) DEVICE — DRILL BIT

## (undated) DEVICE — PADDING CAST W4INXL4YD ST COT COHESIVE HND TEARABLE SPEC

## (undated) DEVICE — DRAPE,TOP,102X53,STERILE: Brand: MEDLINE

## (undated) DEVICE — DRSG GZ OIL EMUL CURAD 3X8 --

## (undated) DEVICE — BANDAGE,ELASTIC,ESMARK,STERILE,4"X9',LF: Brand: MEDLINE

## (undated) DEVICE — BNDG,ELSTC,MATRIX,STRL,4"X5YD,LF,HOOK&LP: Brand: MEDLINE

## (undated) DEVICE — SPLINT MAT XF SPEC 5X30IN --

## (undated) DEVICE — PADDING CAST W2INXL4YD ST COT COHESIVE HND TEARABLE SPEC

## (undated) DEVICE — 3M™ COBAN™ NL STERILE NON-LATEX SELF-ADHERENT WRAP, 2082S, 2 IN X 5 YD (5 CM X 4,5 M), 36 ROLLS/CASE: Brand: 3M™ COBAN™

## (undated) DEVICE — AMD ANTIMICROBIAL GAUZE SPONGES,12 PLY USP TYPE VII, 0.2% POLYHEXAMETHYLENE BIGUANIDE HCI (PHMB): Brand: CURITY

## (undated) DEVICE — DRAPE SHT 3 QTR PROXIMA 53X77 --

## (undated) DEVICE — PREP SKN CHLRAPRP APL 26ML STR --

## (undated) DEVICE — DRAPE C ARM W54XL84IN MINI FOR OEC 6800

## (undated) DEVICE — DRAPE XR C ARM 41X74IN LF --

## (undated) DEVICE — BANDAGE,GAUZE,CONFORMING,2"X75",STRL,LF: Brand: MEDLINE INDUSTRIES, INC.

## (undated) DEVICE — FOOT & ANKLE SOFT DR WOMACK: Brand: MEDLINE INDUSTRIES, INC.

## (undated) DEVICE — SUTURE MCRYL SZ 3-0 L27IN ABSRB UD L19MM PS-2 3/8 CIR PRIM Y427H

## (undated) DEVICE — ZIMMER® STERILE DISPOSABLE TOURNIQUET CUFF WITH PLC, DUAL PORT, SINGLE BLADDER, 30 IN. (76 CM)

## (undated) DEVICE — ADAPTER MON OD15X22MM ID15MM STD LUER FIT W/ LIFESAVER

## (undated) DEVICE — REM POLYHESIVE ADULT PATIENT RETURN ELECTRODE: Brand: VALLEYLAB

## (undated) DEVICE — BNDG,ELSTC,MATRIX,STRL,6"X5YD,LF,HOOK&LP: Brand: MEDLINE

## (undated) DEVICE — SOLUTION IV 1000ML 0.9% SOD CHL

## (undated) DEVICE — PAD,ABDOMINAL,5"X9",ST,LF,25/BX: Brand: MEDLINE INDUSTRIES, INC.